# Patient Record
Sex: MALE | Race: WHITE | NOT HISPANIC OR LATINO | Employment: FULL TIME | URBAN - NONMETROPOLITAN AREA
[De-identification: names, ages, dates, MRNs, and addresses within clinical notes are randomized per-mention and may not be internally consistent; named-entity substitution may affect disease eponyms.]

---

## 2022-07-22 ENCOUNTER — TELEMEDICINE (OUTPATIENT)
Dept: MEDICAL GROUP | Facility: PHYSICIAN GROUP | Age: 52
End: 2022-07-22
Payer: COMMERCIAL

## 2022-07-22 VITALS — BODY MASS INDEX: 20.32 KG/M2 | HEIGHT: 72 IN | WEIGHT: 150 LBS

## 2022-07-22 DIAGNOSIS — Z00.00 HEALTHCARE MAINTENANCE: ICD-10-CM

## 2022-07-22 DIAGNOSIS — R33.9 URINARY RETENTION: ICD-10-CM

## 2022-07-22 PROCEDURE — 99204 OFFICE O/P NEW MOD 45 MIN: CPT | Performed by: STUDENT IN AN ORGANIZED HEALTH CARE EDUCATION/TRAINING PROGRAM

## 2022-07-22 RX ORDER — TAMSULOSIN HYDROCHLORIDE 0.4 MG/1
0.4 CAPSULE ORAL DAILY
Qty: 30 CAPSULE | Refills: 1 | Status: SHIPPED | OUTPATIENT
Start: 2022-07-22 | End: 2022-09-19 | Stop reason: SDUPTHER

## 2022-07-22 RX ORDER — SILDENAFIL CITRATE 20 MG/1
TABLET ORAL
COMMUNITY
Start: 2022-04-30 | End: 2022-11-08

## 2022-07-22 ASSESSMENT — PATIENT HEALTH QUESTIONNAIRE - PHQ9: CLINICAL INTERPRETATION OF PHQ2 SCORE: 0

## 2022-07-22 NOTE — PROGRESS NOTES
Virtual Visit: New Patient   This visit was conducted via Zoom using secure and encrypted videoconferencing technology.   The patient was in their home in the [facility].    The patient's identity was confirmed and verbal consent was obtained for this virtual visit.    Subjective:     CC:   Chief Complaint   Patient presents with   • Establish Care     48 hours has had trouble urinating, was able to go today      Siva Gorman is a 51 y.o. male presenting to establish [facility] and to discuss the evaluation and management of:    Problem   Urinary Retention    Occurred within the last 48 hours. Reports he tries to urinate but nothing comes out. Denies straining, reports he bears down and nothing comes out. Has been taking sildenafil because he was told this helps, reports when he takes it he is able to urinate a little. Reports father in his 80s for BPH.     Prior to 48hours has had weak stream, waking up 1-2 a night to urinate. Also sense of urgency and pressure.     No hematuria, no dysuria, no fevers. He takes no other medications           ROS  See HPI    Current medicines (including changes today)  Current Outpatient Medications   Medication Sig Dispense Refill   • sildenafil (REVATIO) 20 MG tablet      • tamsulosin (FLOMAX) 0.4 MG capsule Take 1 Capsule by mouth every day. 30 Capsule 1     No current facility-administered medications for this visit.       Patient Active Problem List    Diagnosis Date Noted   • Urinary retention 07/22/2022        Objective:   Ht 1.829 m (6')   Wt 68 kg (150 lb)   BMI 20.34 kg/m²     Physical Exam:  Constitutional: Alert, no distress, well-groomed.  Skin: No rashes in visible areas.  Eye: Round. Conjunctiva clear, lids normal. No icterus.   ENMT: Lips pink without lesions, good dentition, moist mucous membranes. Phonation normal.  Neck: No masses, no thyromegaly. Moves freely without pain.  Respiratory: Unlabored respiratory effort, no cough or audible wheeze  Psych:  Alert and oriented x3, normal affect and mood.     Assessment and Plan:   The following treatment plan was discussed:     Problem List Items Addressed This Visit     Urinary retention     Patient reporting acute onset urinary retention.  Reports pressure in his bladder and has not urinated much in the last 2 days.  Denies any dysuria, fevers, hematuria.  Does report to weak stream chronically.    Plan:  Rx Flomax 0.4 mg daily, labs ordered including CMP, PSA, TSH, urinalysis.  Bladder ultrasound ordered    Patient to follow-up next week for symptoms, exam and if time establish care.    1 new problem with moderate complexity medication management.    ER precautions given           Relevant Medications    tamsulosin (FLOMAX) 0.4 MG capsule    Other Relevant Orders    PSA TOTAL W/FREE PSA REFLEX    URINALYSIS,CULTURE IF INDICATED    TSH WITH REFLEX TO FT4    US-BLADDER      Other Visit Diagnoses     Healthcare maintenance        Relevant Orders    CBC WITHOUT DIFFERENTIAL    Basic Metabolic Panel    Lipid Profile          Follow-up: Return in about 1 week (around 7/29/2022) for symptoms.

## 2022-07-22 NOTE — ASSESSMENT & PLAN NOTE
Patient reporting acute onset urinary retention.  Reports pressure in his bladder and has not urinated much in the last 2 days.  Denies any dysuria, fevers, hematuria.  Does report to weak stream chronically.    Plan:  Rx Flomax 0.4 mg daily, labs ordered including CMP, PSA, TSH, urinalysis.  Bladder ultrasound ordered    Patient to follow-up next week for symptoms, exam and if time establish care.    1 new problem with moderate complexity medication management.    ER precautions given

## 2022-08-08 ENCOUNTER — OFFICE VISIT (OUTPATIENT)
Dept: MEDICAL GROUP | Facility: PHYSICIAN GROUP | Age: 52
End: 2022-08-08
Payer: COMMERCIAL

## 2022-08-08 VITALS
RESPIRATION RATE: 16 BRPM | WEIGHT: 134.92 LBS | HEIGHT: 72 IN | OXYGEN SATURATION: 95 % | BODY MASS INDEX: 18.27 KG/M2 | TEMPERATURE: 98.4 F | DIASTOLIC BLOOD PRESSURE: 70 MMHG | SYSTOLIC BLOOD PRESSURE: 112 MMHG | HEART RATE: 73 BPM

## 2022-08-08 DIAGNOSIS — R33.9 URINARY RETENTION: ICD-10-CM

## 2022-08-08 DIAGNOSIS — Z00.00 HEALTHCARE MAINTENANCE: ICD-10-CM

## 2022-08-08 PROCEDURE — 99396 PREV VISIT EST AGE 40-64: CPT | Performed by: STUDENT IN AN ORGANIZED HEALTH CARE EDUCATION/TRAINING PROGRAM

## 2022-08-08 NOTE — PROGRESS NOTES
Subjective:   HISTORY OF THE PRESENT ILLNESS: Patient is a 51 y.o. male here today to establish care    Problem   Healthcare Maintenance   Urinary Retention    Patient was seen at the last visit for an acute problem of urinary retention.  Since then he was put on Flomax with much improvement in his symptoms.  He is having less frequency, nocturia once a night, not straining to urinate.    Denies any dysuria, hematuria.  Still did not get the labs done but ultrasound shows postvoid residual within normal limits.          Current Outpatient Medications Ordered in Epic   Medication Sig Dispense Refill   • sildenafil (REVATIO) 20 MG tablet      • tamsulosin (FLOMAX) 0.4 MG capsule Take 1 Capsule by mouth every day. 30 Capsule 1     No current Epic-ordered facility-administered medications on file.       Review of systems:  Denies chest pain, dyspnea on exertion, dizziness, headaches, abdominal pain, nausea, vomiting, fevers, back pain.    No past medical history on file.  Past Surgical History:   Procedure Laterality Date   • CHOLECYSTECTOMY     • CYST EXCISION      choledocal cyst   • HERNIA REPAIR       Social History     Tobacco Use   • Smoking status: Never Smoker   • Smokeless tobacco: Never Used   Vaping Use   • Vaping Use: Never used   Substance Use Topics   • Alcohol use: Not Currently   • Drug use: Not Currently      No family history on file.  Current Outpatient Medications   Medication Sig Dispense Refill   • sildenafil (REVATIO) 20 MG tablet      • tamsulosin (FLOMAX) 0.4 MG capsule Take 1 Capsule by mouth every day. 30 Capsule 1     No current facility-administered medications for this visit.       Allergies:  No Known Allergies    Allergies, past medical history, past surgical history, family history, social history reviewed and updated.    Objective:    /70 (BP Location: Right arm, Patient Position: Sitting, BP Cuff Size: Adult)   Pulse 73   Temp 36.9 °C (98.4 °F) (Temporal)   Resp 16   Ht 1.829  m (6')   Wt 61.2 kg (134 lb 14.7 oz)   SpO2 95%   BMI 18.30 kg/m²    Body mass index is 18.3 kg/m².    Physical exam:  General: Normal appearance, no acute distress, not ill-appearing  HEENT: EOM intact, conjunctiva normal limits, negative right/left eye discharge.  Sclera anicteric  Cardiovascular: Normal rate and rhythm, no murmurs  Pulmonary: No respiratory distress, no wheezing, no rales, breath sounds normal.  Abdomen: Bowel sounds normal, flat, soft.  Musculoskeletal: No edema bilaterally  Skin: Warm, dry, no lesions  Neurological: No focal deficits, normal gait  Psychiatric: Mood within normal limits    Assessment/Plan:      Problem List Items Addressed This Visit     Urinary retention     Prostate exam today difficult to assess secondary to patient anxiousness.    Follow-up PSA.  Continue Flomax 0.4 mg daily.  Consider repeating prostate exam next year.           Healthcare maintenance     Patient here for a preventive medicine visit today and to establish care.  -Reviewed all past medical history, family history, social history    -Diet and exercise appropriate counseling given  -Social determinants of health reviewed  -Tobacco, alcohol, recreational drug use: reviewed no concerns.   -Cholesterol screening: ordered  -Diabetes screening: ordered      Immunizations/Infectious disease:    Safe sex practices discusssed  Immunizations: reports up to date.   Had covid vaccine with 2 booster.    Cancer screenings:  Colorectal cancer screening: colonoscopy 2018 [facility].  Records requested.    Prostate Cancer Screening/PSA: ordered                    Return in about 1 year (around 8/8/2023) for annual.

## 2022-08-08 NOTE — LETTER
[employer]  Calderon Marin D.O.  [address]  [facility] [address]  Fax: [phone]   Authorization for Release/Disclosure of   Protected Health Information   Name: SHERIF PANIAGUA : 1970 SSN: [id number]   Address: [address] [place] [id number] Phone:    [phone] (home)    I authorize the entity listed below to release/disclose the PHI below to:   [facility]/Calderon Marin D.O. and Calderon Marin D.O.   Provider or Entity Name:  [place], [place]   Address   City, [facility]   Phone:      Fax:     Reason for request: continuity of care   Information to be released:    [ x ] LAST COLONOSCOPY,  including any PATH REPORT and follow-up  [  ] LAST FIT/COLOGUARD RESULT [  ] LAST DEXA  [  ] LAST MAMMOGRAM  [  ] LAST PAP  [  ] LAST LABS [  ] RETINA EXAM REPORT  [  ] IMMUNIZATION RECORDS  [  ] Release all info      [  ] Check here and initial the line next to each item to release ALL health information INCLUDING  _____ Care and treatment for drug and / or alcohol abuse  _____ HIV testing, infection status, or AIDS  _____ Genetic Testing    DATES OF SERVICE OR TIME PERIOD TO BE DISCLOSED: _____________  I understand and acknowledge that:  * This Authorization may be revoked at any time by you in writing, except if your health information has already been used or disclosed.  * Your health information that will be used or disclosed as a result of you signing this authorization could be re-disclosed by the recipient. If this occurs, your re-disclosed health information may no longer be protected by State or Federal laws.  * You may refuse to sign this Authorization. Your refusal will not affect your ability to obtain treatment.  * This Authorization becomes effective upon signing and will  on (date) __________.      If no date is indicated, this Authorization will  one (1) year from the signature date.    Name: Sherif Paniagua    Signature:   Date:      8/8/2022       PLEASE FAX REQUESTED RECORDS BACK TO: [phone]

## 2022-08-08 NOTE — ASSESSMENT & PLAN NOTE
Patient here for a preventive medicine visit today and to establish care.  -Reviewed all past medical history, family history, social history    -Diet and exercise appropriate counseling given  -Social determinants of health reviewed  -Tobacco, alcohol, recreational drug use: reviewed no concerns.   -Cholesterol screening: ordered  -Diabetes screening: ordered      Immunizations/Infectious disease:    Safe sex practices discusssed  Immunizations: reports up to date.   Had covid vaccine with 2 booster.    Cancer screenings:  Colorectal cancer screening: colonoscopy 2018 [facility].  Records requested.    Prostate Cancer Screening/PSA: ordered

## 2022-08-08 NOTE — ASSESSMENT & PLAN NOTE
Prostate exam today difficult to assess secondary to patient anxiousness.    Follow-up PSA.  Continue Flomax 0.4 mg daily.  Consider repeating prostate exam next year.

## 2022-09-19 DIAGNOSIS — R33.9 URINARY RETENTION: ICD-10-CM

## 2022-09-19 NOTE — TELEPHONE ENCOUNTER
Received request via: Patient    Was the patient seen in the last year in this department? Yes    Does the patient have an active prescription (recently filled or refills available) for medication(s) requested? No    Patient called office in regards to needing a refill on his medication. He would also like to know if his labs came back normal.    Please advise.

## 2022-09-20 RX ORDER — TAMSULOSIN HYDROCHLORIDE 0.4 MG/1
0.4 CAPSULE ORAL DAILY
Qty: 30 CAPSULE | Refills: 1 | Status: SHIPPED | OUTPATIENT
Start: 2022-09-20 | End: 2022-11-18

## 2022-10-28 ENCOUNTER — OFFICE VISIT (OUTPATIENT)
Dept: URGENT CARE | Facility: CLINIC | Age: 52
End: 2022-10-28
Payer: COMMERCIAL

## 2022-10-28 VITALS
DIASTOLIC BLOOD PRESSURE: 60 MMHG | RESPIRATION RATE: 16 BRPM | OXYGEN SATURATION: 95 % | HEIGHT: 72 IN | SYSTOLIC BLOOD PRESSURE: 100 MMHG | TEMPERATURE: 97.3 F | BODY MASS INDEX: 18.28 KG/M2 | WEIGHT: 135 LBS | HEART RATE: 68 BPM

## 2022-10-28 DIAGNOSIS — R55 SYNCOPE AND COLLAPSE: ICD-10-CM

## 2022-10-28 DIAGNOSIS — S01.111A EYEBROW LACERATION, RIGHT, INITIAL ENCOUNTER: ICD-10-CM

## 2022-10-28 DIAGNOSIS — S00.83XA TRAUMATIC ECCHYMOSIS OF FACE, INITIAL ENCOUNTER: ICD-10-CM

## 2022-10-28 PROCEDURE — 99214 OFFICE O/P EST MOD 30 MIN: CPT | Performed by: NURSE PRACTITIONER

## 2022-10-29 NOTE — PROGRESS NOTES
Subjective:   Sherif Paniagua is a 51 y.o. male who presents for Fall (DOI 10/25/22, passed out, bruised eyes, abd pain, tightness on abdomen, light headed, dizziness, hit ceramic tile with head, closed head lac R eyebrow)       HPI  Patient presents for evaluation of syncopal event 3 days ago.  Patient states that he was feeling unwell, and was walking from the bathroom back to the bedroom, when he suffered a syncopal event, causing him to land face first onto a hard floor, causing a facial laceration.  Patient states he was able to obtain homeostasis and the bleeding ceased, has noted ecchymosis on nose, face, and around eyes eyes.  [facility] followed up with [facility] yesterday, think that it may have been a UTI causing him to have a syncopal event.  His urinalysis was clear at that time.  [facility] recommended he go to the ED for further evaluation and care, however he declined at that time.  Patient presents today with concerns of reason for syncopal event.    ROS  All other systems are negative except as documented above within HPI.    MEDS:   Current Outpatient Medications:     tamsulosin (FLOMAX) 0.4 MG capsule, Take 1 Capsule by mouth every day., Disp: 30 Capsule, Rfl: 1    sildenafil (REVATIO) 20 MG tablet, , Disp: , Rfl:   ALLERGIES: No Known Allergies    Patient's PMH, SocHx, SurgHx, FamHx, Drug allergies and medications were reviewed.     Objective:   /60 (BP Location: Left arm, Patient Position: Sitting, BP Cuff Size: Adult)   Pulse 68   Temp 36.3 °C (97.3 °F) (Temporal)   Resp 16   Ht 1.829 m (6')   Wt 61.2 kg (135 lb)   SpO2 95%   BMI 18.31 kg/m²     Physical Exam  Vitals and nursing note reviewed.   Constitutional:       General: He is awake.      Appearance: Normal appearance. He is well-developed and normal weight.   HENT:      Head: Normocephalic and atraumatic.        Comments: 2cm right eyebrow laceration with ecchymosis     Right Ear: Tympanic  membrane, ear canal and external ear normal.      Left Ear: Tympanic membrane, ear canal and external ear normal.      Nose: Nose normal.      Comments: Significant ecchymosis noted around patient's bilateral eyes, as well as along nose.  Right forehead laceration as diagrammed.     Mouth/Throat:      Lips: Pink.      Mouth: Mucous membranes are moist.      Pharynx: Oropharynx is clear. Uvula midline.   Eyes:      Extraocular Movements: Extraocular movements intact.      Conjunctiva/sclera: Conjunctivae normal.      Pupils: Pupils are equal, round, and reactive to light.   Neck:      Thyroid: No thyromegaly.      Trachea: Trachea normal.   Cardiovascular:      Rate and Rhythm: Normal rate and regular rhythm.      Pulses: Normal pulses.      Heart sounds: Normal heart sounds, S1 normal and S2 normal.   Pulmonary:      Effort: Pulmonary effort is normal. No respiratory distress.      Breath sounds: Normal breath sounds. No wheezing, rhonchi or rales.   Abdominal:      General: Bowel sounds are normal.      Palpations: Abdomen is soft.   Musculoskeletal:         General: Normal range of motion.      Cervical back: Full passive range of motion without pain, normal range of motion and neck supple.   Lymphadenopathy:      Cervical: No cervical adenopathy.   Skin:     General: Skin is warm and dry.      Capillary Refill: Capillary refill takes less than 2 seconds.   Neurological:      General: No focal deficit present.      Mental Status: He is alert and oriented to person, place, and time.      Gait: Gait is intact.   Psychiatric:         Attention and Perception: Attention and perception normal.         Mood and Affect: Mood normal.         Speech: Speech normal.         Behavior: Behavior normal. Behavior is cooperative.         Thought Content: Thought content normal.         Judgment: Judgment normal.       Assessment/Plan:   Assessment    1. Syncope and collapse    2. Eyebrow laceration, right, initial  encounter    3. Traumatic ecchymosis of face, initial encounter      Vital signs stable at today's acute urgent care visit.  Discussed with patient at length my concern as a cause of syncope.  It is apparent that he suffered a severe fall, consequently injury large eyebrow laceration and facial ecchymosis.  Patient has several questions as to outpatient work-up versus inpatient, and patient is recommended due to unknown cause of syncopal event.  All questions were encouraged and answered to the patient's satisfaction and understanding, and they agree to the plan of care.     This is an acute problem with uncertain prognosis, medication management and instructions as well as management options were provided.  I personally reviewed prior external notes and test results pertinent to today and independently reviewed and interpreted all diagnostics. Time spent evaluating this patient includes preparing for visit, counseling/education, exam, evaluation, obtaining history, and ordering lab/test/procedures.      Please note that this dictation was created using voice recognition software. I have made a reasonable attempt to correct obvious errors, but I expect that there are errors of grammar and possibly content that I did not discover before finalizing the note.

## 2022-11-01 ENCOUNTER — TELEMEDICINE (OUTPATIENT)
Dept: MEDICAL GROUP | Facility: PHYSICIAN GROUP | Age: 52
End: 2022-11-01
Payer: COMMERCIAL

## 2022-11-01 VITALS
DIASTOLIC BLOOD PRESSURE: 70 MMHG | HEIGHT: 72 IN | SYSTOLIC BLOOD PRESSURE: 118 MMHG | WEIGHT: 137 LBS | TEMPERATURE: 98.5 F | HEART RATE: 80 BPM | BODY MASS INDEX: 18.56 KG/M2

## 2022-11-01 DIAGNOSIS — S06.0X9A CONCUSSION WITH LOSS OF CONSCIOUSNESS, INITIAL ENCOUNTER: ICD-10-CM

## 2022-11-01 DIAGNOSIS — R55 SYNCOPE AND COLLAPSE: ICD-10-CM

## 2022-11-01 DIAGNOSIS — S09.90XA TRAUMATIC INJURY OF HEAD, INITIAL ENCOUNTER: ICD-10-CM

## 2022-11-01 PROCEDURE — 99214 OFFICE O/P EST MOD 30 MIN: CPT | Mod: 95 | Performed by: STUDENT IN AN ORGANIZED HEALTH CARE EDUCATION/TRAINING PROGRAM

## 2022-11-01 NOTE — LETTER
November 1, 2022    To Whom It May Concern:         This is confirmation that Sherif Paniagua attended his scheduled appointment with Calderon Marin D.O. on 11/01/22.    Sherif is currently under my care. It is medically indicated that he be off work for 1 week for further evaluation.          If you have any questions please do not hesitate to call me at the phone number listed below.    Sincerely,    Calderon Marin D.O.  [phone]

## 2022-11-01 NOTE — ASSESSMENT & PLAN NOTE
CT head without contrast ordered.  This was a one-time episode perhaps vasovagal syncope.  There are no other associated symptoms that he had been having before the injury, no other episodes of syncope or dizziness. No cardiac risk factors,  Only medication patient takes his Flomax.    Referral to cardiology

## 2022-11-01 NOTE — PROGRESS NOTES
Virtual Visit: Established Patient   This visit was conducted via Zoom using secure and encrypted videoconferencing technology.   The patient was in their home in the [facility].    The patient's identity was confirmed and verbal consent was obtained for this virtual visit.    Subjective:   CC:   Chief Complaint   Patient presents with    Syncope     Concerns of poss head injury/ headache, light headedness     Sherif Paniagua is a 51 y.o. male presenting for evaluation and management of:    Problem   Head Trauma    Patient had head trauma date of injury 10/25/2022.  Patient reports that he was in the bathroom then started to feel like the room was spinning as he walked over to go to his bedroom to lay down he fainted and hit his hard floors.  He did hit his head but did not want to go to the emergency room.  He went to urgent care had laceration, was not sent for CT imaging of his head.  He has been having some bruising of his left thigh that is starting to improve.  He reports he is having headaches, dizziness, lightheadedness, generalized weakness and fatigue.  Denies any nausea, vomiting, chest pain.     Syncope and Collapse    Patient fell causing the injury on 10/25/2022.  Noted the sensation of the room was spinning and then fainted and hit the head on his floor.  Loss of consciousness cannot say for sure how long he was out for.  This was a one-time occurrence and has never happened before and has never happened since.  He was not recently ill, has no other significant medical issues.  Denies any chest pain, palpitations, dyspnea on exertion, chronic dizziness or headaches.           ROS   Per HPI    Current medicines (including changes today)  Current Outpatient Medications   Medication Sig Dispense Refill    tamsulosin (FLOMAX) 0.4 MG capsule Take 1 Capsule by mouth every day. 30 Capsule 1    sildenafil (REVATIO) 20 MG tablet        No current facility-administered medications for this visit.        Patient Active Problem List    Diagnosis Date Noted    Head trauma 11/01/2022    Syncope and collapse 11/01/2022    Healthcare maintenance 08/08/2022    Urinary retention 07/22/2022        Objective:   /70   Pulse 80   Temp 36.9 °C (98.5 °F) (Temporal)   Ht 1.829 m (6')   Wt 62.1 kg (137 lb)   BMI 18.58 kg/m²     Physical Exam:  Constitutional: Alert, no distress, well-groomed.  Head: Laceration right-sided brow, left-sided ecchymosis periorbital  Skin: No rashes in visible areas.  Eye: Round. Conjunctiva clear, lids normal. No icterus.   ENMT: Lips pink without lesions, good dentition, moist mucous membranes. Phonation normal.  Neck: No masses, no thyromegaly. Moves freely without pain.  Respiratory: Unlabored respiratory effort, no cough or audible wheeze  Psych: Alert and oriented x3, normal affect and mood.     Assessment and Plan:   The following treatment plan was discussed:     Problem List Items Addressed This Visit       Head trauma     No focal neurological deficits but significant concussion type symptoms.    Referral for urgent imaging CT head without contrast  Referral for PMR for concussion management  Note given to get off work for at least 1 week for reevaluation    Return to care 1 week         Relevant Orders    CT-HEAD W/O    Syncope and collapse     CT head without contrast ordered.  This was a one-time episode perhaps vasovagal syncope.  There are no other associated symptoms that he had been having before the injury, no other episodes of syncope or dizziness. No cardiac risk factors,  Only medication patient takes his Flomax.    Referral to cardiology         Relevant Orders    CT-HEAD W/O    REFERRAL TO CARDIOLOGY     Other Visit Diagnoses       Concussion with loss of consciousness, initial encounter        Relevant Orders    Referral to Physical Medicine Rehab             Follow-up: Return in about 1 week (around 11/8/2022) for symptoms.

## 2022-11-01 NOTE — ASSESSMENT & PLAN NOTE
No focal neurological deficits but significant concussion type symptoms.    Referral for urgent imaging CT head without contrast  Referral for PMR for concussion management  Note given to get off work for at least 1 week for reevaluation    Return to care 1 week

## 2022-11-05 ENCOUNTER — HOSPITAL ENCOUNTER (OUTPATIENT)
Dept: RADIOLOGY | Facility: MEDICAL CENTER | Age: 52
End: 2022-11-05
Attending: STUDENT IN AN ORGANIZED HEALTH CARE EDUCATION/TRAINING PROGRAM
Payer: COMMERCIAL

## 2022-11-05 DIAGNOSIS — S09.90XA TRAUMATIC INJURY OF HEAD, INITIAL ENCOUNTER: ICD-10-CM

## 2022-11-05 DIAGNOSIS — R55 SYNCOPE AND COLLAPSE: ICD-10-CM

## 2022-11-05 PROCEDURE — 70450 CT HEAD/BRAIN W/O DYE: CPT

## 2022-11-08 ENCOUNTER — TELEMEDICINE (OUTPATIENT)
Dept: MEDICAL GROUP | Facility: PHYSICIAN GROUP | Age: 52
End: 2022-11-08
Payer: COMMERCIAL

## 2022-11-08 VITALS — BODY MASS INDEX: 18.56 KG/M2 | HEIGHT: 72 IN | WEIGHT: 137 LBS

## 2022-11-08 DIAGNOSIS — S06.0X9D CONCUSSION WITH LOSS OF CONSCIOUSNESS, SUBSEQUENT ENCOUNTER: ICD-10-CM

## 2022-11-08 PROBLEM — S06.0XAA CONCUSSION: Status: ACTIVE | Noted: 2022-11-08

## 2022-11-08 PROCEDURE — 99213 OFFICE O/P EST LOW 20 MIN: CPT | Mod: 95 | Performed by: STUDENT IN AN ORGANIZED HEALTH CARE EDUCATION/TRAINING PROGRAM

## 2022-11-08 NOTE — ASSESSMENT & PLAN NOTE
CT reviewed within normal limits  Physiatry referral in place for concussion management    Due to patient's continuing significant headaches, lightheadedness, fatigue I do recommend him being off work for 1 more week and then a slow gradual return to work.  Ideally patient should be symptom-free before returning to work.  Further recommendations from physiatry requested, patient has consultation this week.

## 2022-11-08 NOTE — PROGRESS NOTES
Virtual Visit: Established Patient   This visit was conducted via Zoom using secure and encrypted videoconferencing technology.   The patient was in their home in the [facility].    The patient's identity was confirmed and verbal consent was obtained for this virtual visit.    Subjective:   CC:   Chief Complaint   Patient presents with    Follow-Up     1 wk     Sherif Paniagua is a 52 y.o. male presenting for evaluation and management of:    Problem   Concussion    Patient is here to follow-up on his head injury from 10/25/2022.  CT scan of his head that was ordered on that date is negative for any acute findings.  He is still having significant headaches, fatigue and some lightheadedness.  Has not had any further episodes of syncope.  Denies any nausea, vomiting, blurry vision.  He still has not seen the physiatrist yet for concussion management he has been off work for the last week          ROS   Per hpi    Current medicines (including changes today)  Current Outpatient Medications   Medication Sig Dispense Refill    tamsulosin (FLOMAX) 0.4 MG capsule Take 1 Capsule by mouth every day. 30 Capsule 1     No current facility-administered medications for this visit.       Patient Active Problem List    Diagnosis Date Noted    Concussion 11/08/2022    Head trauma 11/01/2022    Syncope and collapse 11/01/2022    Healthcare maintenance 08/08/2022    Urinary retention 07/22/2022        Objective:   Ht 1.829 m (6')   Wt 62.1 kg (137 lb)   BMI 18.58 kg/m²     Physical Exam:  Constitutional: Alert, no distress, well-groomed.  Skin: No rashes in visible areas.  Eye: Round. Conjunctiva clear, lids normal. No icterus.   ENMT: Lips pink without lesions, good dentition, moist mucous membranes. Phonation normal.  Neck: No masses, no thyromegaly. Moves freely without pain.  Respiratory: Unlabored respiratory effort, no cough or audible wheeze  Psych: Alert and oriented x3, normal affect and mood.     Assessment and  Plan:   The following treatment plan was discussed:     Problem List Items Addressed This Visit       Concussion     CT reviewed within normal limits  Physiatry referral in place for concussion management    Due to patient's continuing significant headaches, lightheadedness, fatigue I do recommend him being off work for 1 more week and then a slow gradual return to work.  Ideally patient should be symptom-free before returning to work.  Further recommendations from physiatry requested, patient has consultation this week.              Follow-up: Return in about 2 weeks (around 11/22/2022) for symptoms.

## 2022-11-18 DIAGNOSIS — R33.9 URINARY RETENTION: ICD-10-CM

## 2022-11-18 RX ORDER — TAMSULOSIN HYDROCHLORIDE 0.4 MG/1
CAPSULE ORAL
Qty: 90 CAPSULE | Refills: 1 | Status: SHIPPED
Start: 2022-11-18 | End: 2023-04-26

## 2022-11-18 NOTE — TELEPHONE ENCOUNTER
Received request via: Patient    Was the patient seen in the last year in this department? Yes    Does the patient have an active prescription (recently filled or refills available) for medication(s) requested? No    Does the patient have long-term Plus and need 100 day supply (blood pressure, diabetes and cholesterol meds only)? Patient does not have SCP

## 2022-11-23 ENCOUNTER — OFFICE VISIT (OUTPATIENT)
Dept: PHYSICAL MEDICINE AND REHAB | Facility: MEDICAL CENTER | Age: 52
End: 2022-11-23
Payer: COMMERCIAL

## 2022-11-23 VITALS
HEART RATE: 70 BPM | OXYGEN SATURATION: 97 % | BODY MASS INDEX: 18.83 KG/M2 | DIASTOLIC BLOOD PRESSURE: 72 MMHG | SYSTOLIC BLOOD PRESSURE: 130 MMHG | HEIGHT: 72 IN | TEMPERATURE: 96.7 F | RESPIRATION RATE: 17 BRPM | WEIGHT: 139 LBS

## 2022-11-23 DIAGNOSIS — R45.89: ICD-10-CM

## 2022-11-23 DIAGNOSIS — F07.81 POSTCONCUSSIVE SYNDROME: Primary | ICD-10-CM

## 2022-11-23 DIAGNOSIS — R42 VERTIGO: ICD-10-CM

## 2022-11-23 DIAGNOSIS — G44.319 ACUTE POST-TRAUMATIC HEADACHE, NOT INTRACTABLE: ICD-10-CM

## 2022-11-23 DIAGNOSIS — R68.89 LIGHT SENSITIVITY: ICD-10-CM

## 2022-11-23 DIAGNOSIS — R42 DIZZINESS: ICD-10-CM

## 2022-11-23 PROCEDURE — 99204 OFFICE O/P NEW MOD 45 MIN: CPT | Performed by: PHYSICAL MEDICINE & REHABILITATION

## 2022-11-23 NOTE — PROGRESS NOTES
[facility]  [facility]  [facility] [place] [address]  Ph: [phone]    NEW PATIENT POST-CONCUSSIVE EVALUATION    Patient Name: Sherif Paniagua   Patient : 1970  Patient Age: 52 y.o.   Examining Physician: Dr. Keiko Samuel DO    SUBJECTIVE:   Patient Identification: Sherif Paniagua is a 52 y.o. male without significant PMH and rehabilitation history significant for evaluation for postconcussive syndrome and is presenting to PM&R clinic for a NEW OUTPATIENT evaluation with the following chief complaint/s:    Chief Complaint: Postconcussive evaluation    History of Present Illness:    -Records reviewed: Patient sustained his injury 10/25/2022.  Reportedly he was in the bathroom and felt like the room was spinning.  He walked over to his bedroom to try to lay down but fainted and hit the hard floor.  He went to urgent care.  He had a laceration on his head.  He was not sent for imaging.  He also had some bruising of his left thigh which improved.  Patient has been referred to cardiology for the syncope.  -Approximate date/time of injury: 10/25/2022  -Loss of consciousness: Yes, unclear duration.  -Duration of memory loss: None  -Imaging findings: CT head negative  -GCS at time of injury: Unknown -no first responders to assess.  -Mood disorder prior to injury: None  -Mood abnormalities after injury: Yes - more emotional  -Occupation: Currently not working.     Having post traumatic headaches, vertigo, forgetfulness. The headaches are worse in the afternoon, worse with using computer. No difficulty sleeping. Sleeping a little more than he had. Has had some problem urinating the past few months and is on Flomax. Has had cholecystectomy and cyst removed from duodenum. Has been more emotional lately. Example is watching something sad on TV and crying or being more irritable than prior with his daughter. Has some light sensitivity as well. Keeps low light, or  using a hoodie to block the direct light. He is not taking any medications for his HA. Lays down and rests. The vertigo is what is most concerning to him. It is not all day. It is everyday for short periods. Feels light headed and imbalanced. Feels as though the world is spinning to a smaller degree. For the most part he is managing conservatively. Has a small degree of difficulty concentrating. He does feel like his sinus pain is improving since swelling reduced.     Review of Systems:  All other pertinent positive review of systems are noted above in HPI.   All other systems reviewed and are negative.    Past Medical History:  No past medical history on file.   Past Surgical History:   Procedure Laterality Date    CHOLECYSTECTOMY      CYST EXCISION      choledocal cyst    HERNIA REPAIR          Current Outpatient Medications:     tamsulosin (FLOMAX) 0.4 MG capsule, TAKE ONE CAPSULE BY MOUTH DAILY, Disp: 90 Capsule, Rfl: 1  No Known Allergies     Past Social History:  Social History     Socioeconomic History    Marital status:      Spouse name: Not on file    Number of children: Not on file    Years of education: Not on file    Highest education level: Not on file   Occupational History    Not on file   Tobacco Use    Smoking status: Never    Smokeless tobacco: Never   Vaping Use    Vaping Use: Never used   Substance and Sexual Activity    Alcohol use: Not Currently    Drug use: Not Currently    Sexual activity: Not on file   Other Topics Concern    Not on file   Social History Narrative    Not on file     Social Determinants of Health     Financial Resource Strain: Not on file   Food Insecurity: Not on file   Transportation Needs: Not on file   Physical Activity: Not on file   Stress: Not on file   Social Connections: Not on file   Intimate Partner Violence: Not on file   Housing Stability: Not on file        Family History:  History reviewed. No pertinent family history.    Depression and Opioid  Screening  PHQ-9:      7/22/2022     9:40 AM   Depression Screen (PHQ-2/PHQ-9)   PHQ-2 Total Score 0     Interpretation of PHQ-9 Total Score   Score Severity   1-4 No Depression   5-9 Mild Depression   10-14 Moderate Depression   15-19 Moderately Severe Depression   20-27 Severe Depression     OBJECTIVE:   Vital Signs:  Vitals:    11/23/22 0926   BP: 130/72   Pulse: 70   Resp: 17   Temp: 35.9 °C (96.7 °F)   SpO2: 97%        Pertinent Labs:  Lab Results   Component Value Date/Time    SODIUM 142 09/10/2022 11:10 AM    POTASSIUM 4.0 09/10/2022 11:10 AM    CHLORIDE 104 09/10/2022 11:10 AM    CO2 32 09/10/2022 11:10 AM    GLUCOSE 74 09/10/2022 11:10 AM    BUN 13 09/10/2022 11:10 AM    CREATININE 0.79 (L) 09/10/2022 11:10 AM    GLOMRATE 103 09/10/2022 11:10 AM       No results found for: HBA1C    No results found for: WBC, RBC, HEMOGLOBIN, HEMATOCRIT, MCV, MCH, MCHC, MPV, NEUTSPOLYS, LYMPHOCYTES, MONOCYTES, EOSINOPHILS, BASOPHILS, HYPOCHROMIA, ANISOCYTOSIS    No results found for: ASTSGOT, ALTSGPT     Physical Exam:   GEN: No apparent distress  HEENT: Head normocephalic, healed laceration right eyebrow.  Sclera nonicteric bilaterally, no ocular discharge appreciated bilaterally.  CV: Extremities warm and well-perfused, no peripheral edema appreciated bilaterally.  PULMONARY: Breathing nonlabored on room air, no respiratory accessory muscle use.  Not requiring supplemental oxygen.  SKIN: No appreciable skin breakdown on exposed areas of skin.  PSYCH: Mood and affect within normal limits.  NEURO: Awake alert.  Conversational.  Logical thought content.  Strength intact, ambulatory without assistive device.    Imaging:   CT head 11/5/2022  IMPRESSION:  1.  No evidence of acute intracranial process.  2.  Atrophy.    ASSESSMENT/PLAN: Sherif Paniagua  is a 52 y.o. male with rehabilitation history significant for postconcussive syndrome, here for evaluation. The following plan was discussed with the patient who is in  agreement.     Visit Diagnoses     ICD-10-CM   1. Postconcussive syndrome  F07.81   2. Vertigo  R42   3. Dizziness  R42   4. Light sensitivity  R68.89   5. Acute post-traumatic headache, not intractable  G44.319   6. Increased emotional excitability  R45.89        Rehab/Neuro:   Postconcussion syndrome/persistent postconcussion symptoms  -Date of injury: 10/25/2022  -Records reviewed as aforementioned in the HPI.  -Counseled on post concussive syndrome, symptoms, prognosis, treatment options.  -Counseled that treatment is aimed at symptom alleviation, as opposed to modifying underlying pathology.  -Diagnostic Criteria DSM-IV:   A. History of TBI: No history of prior TBI.    B. Neurobehavioral testing of cognitive deficits: No   C. 3+ symptoms persisting 3+ months after injury: Patient is not 3 months post-injury  -Diagnostic criteria ICD-10:   A. Symptom initiation within 1 month of injury: Yes   A. 3+ of the following symptoms: Yes    a.  Headache: Yes    b.  Dizziness: Yes    c.  Fatigue: Yes    d.  Irritability: Yes    e.  Insomnia: No    f.  Difficulty concentrating: Yes    g.  Memory problems: Yes    h: Intolerance of stress, emotion, alcohol: Yes, mild    Assessment of Current Functional Status: Patient currently is in between jobs.  He is able to work around his symptoms.  He rests when he needs to rest.  Fortunately he is sleeping really well.  Counseled extensively on adequate sleep, naps okay during the day as long as he maintains good sleep patterns at night.  At this time I counseled regarding symptom management.  Patient wishes to avoid pharmacologic agents which I think is completely reasonable.  He fortunately is getting better every day and is well within the timeframe of expected continued neuro recovery.  We discussed vestibular therapy or speech therapy for memory, I do not think it is entirely indicated at this time but if patient continues to have issues he knows that he can reach out and I will  refer him.      Follow up: As needed if symptoms persist or do not improve    Thank you to the referring practitioner for the ability to assist with co-management of this patient. If there are any questions or concerns, please do not hesitate to contact me.     Total time spent was 45 minutes.  Included in this time is the time spent preparing for the visit including record review, my exam and evaluation, counseling and education regarding that which is aforementioned in the assessment and plan. Some of the time included occurred outside of charting time.  Discussion involved the patient.     Please note that this dictation was created using voice recognition software. I have made every reasonable attempt to correct obvious errors but there may be errors of grammar and content that I may have overlooked prior to finalization of this note.    Dr. Keiko Samuel DO, MS  Department of Physical Medicine & Rehabilitation  Neuro Rehabilitation Clinic  [facility]

## 2023-01-23 ENCOUNTER — TELEPHONE (OUTPATIENT)
Dept: CARDIOLOGY | Facility: MEDICAL CENTER | Age: 53
End: 2023-01-23
Payer: COMMERCIAL

## 2023-01-23 NOTE — TELEPHONE ENCOUNTER
Called and spoke to pt, he states this will be his first time seeing a cardiology. Most recent labs in [facility] from 9/2022. Confirm appt with [facility] time, date, and location.

## 2023-02-07 ENCOUNTER — OFFICE VISIT (OUTPATIENT)
Dept: MEDICAL GROUP | Facility: PHYSICIAN GROUP | Age: 53
End: 2023-02-07
Payer: COMMERCIAL

## 2023-02-07 VITALS
TEMPERATURE: 97.7 F | DIASTOLIC BLOOD PRESSURE: 70 MMHG | BODY MASS INDEX: 18.96 KG/M2 | HEART RATE: 70 BPM | HEIGHT: 72 IN | OXYGEN SATURATION: 98 % | WEIGHT: 139.99 LBS | SYSTOLIC BLOOD PRESSURE: 132 MMHG | RESPIRATION RATE: 12 BRPM

## 2023-02-07 DIAGNOSIS — I45.9 SKIPPED HEART BEATS: ICD-10-CM

## 2023-02-07 DIAGNOSIS — R55 SYNCOPE AND COLLAPSE: ICD-10-CM

## 2023-02-07 PROCEDURE — 99213 OFFICE O/P EST LOW 20 MIN: CPT | Performed by: STUDENT IN AN ORGANIZED HEALTH CARE EDUCATION/TRAINING PROGRAM

## 2023-02-07 PROCEDURE — 93000 ELECTROCARDIOGRAM COMPLETE: CPT | Performed by: STUDENT IN AN ORGANIZED HEALTH CARE EDUCATION/TRAINING PROGRAM

## 2023-02-07 ASSESSMENT — PATIENT HEALTH QUESTIONNAIRE - PHQ9: CLINICAL INTERPRETATION OF PHQ2 SCORE: 0

## 2023-02-07 NOTE — ASSESSMENT & PLAN NOTE
EKG reviewed today shows sinus rate and rhythm, no ST changes, no T wave inversions, normal axis, normal intervals.    Zio patch ordered 14 days  Patient ports he has cardiology consultation next month    Follow-up 2 months or earlier if needed

## 2023-02-07 NOTE — PROGRESS NOTES
HISTORY OF PRESENT ILLNESS: Sherif is a pleasant 52 y.o. male, established patient who presents today to discuss medical problems as listed below:    Problem   Skipped Heart Beats    Patient reports feels like heart is stopping or skipping beats at night. Was referred to cardiology for testing for 1 episode of syncope and collapse but had declined at that time. Would like to get further testing done now at this time due to these increased symptoms. Reports when laying flight feels like heart skips beats.     Reports he does snore occasionally, denies that wife reports it is loud.     Denies any chest pain, dyspnea on exertion, edema, orthopnea.              Current Outpatient Medications Ordered in Epic   Medication Sig Dispense Refill    tamsulosin (FLOMAX) 0.4 MG capsule TAKE ONE CAPSULE BY MOUTH DAILY 90 Capsule 1     No current Epic-ordered facility-administered medications on file.       Review of systems:  Per HPI    History reviewed. No pertinent past medical history.  Past Surgical History:   Procedure Laterality Date    CHOLECYSTECTOMY      CYST EXCISION      choledocal cyst    HERNIA REPAIR       Social History     Tobacco Use    Smoking status: Never    Smokeless tobacco: Never   Vaping Use    Vaping Use: Never used   Substance Use Topics    Alcohol use: Not Currently    Drug use: Not Currently      History reviewed. No pertinent family history.  Current Outpatient Medications   Medication Sig Dispense Refill    tamsulosin (FLOMAX) 0.4 MG capsule TAKE ONE CAPSULE BY MOUTH DAILY 90 Capsule 1     No current facility-administered medications for this visit.       Allergies:  No Known Allergies    Allergies, past medical history, past surgical history, family history, social history reviewed and updated.    Objective:    /70 (BP Location: Right arm, Patient Position: Sitting, BP Cuff Size: Adult)   Pulse 70   Temp 36.5 °C (97.7 °F) (Temporal)   Resp 12   Ht 1.829 m (6')   Wt 63.5 kg (139 lb  15.9 oz)   SpO2 98%   BMI 18.99 kg/m²    Body mass index is 18.99 kg/m².    Physical exam:  General: Normal appearance, no acute distress, not ill-appearing  HEENT: EOM intact, conjunctiva normal limits, negative right/left eye discharge.  Sclera anicteric  Cardiovascular: Normal rate and rhythm, no murmurs  Pulmonary: No respiratory distress, no wheezing, no rales, breath sounds normal.  Abdomen: Bowel sounds normal, flat, soft.  Musculoskeletal: No edema bilaterally  Skin: Warm, dry, no lesions  Neurological: No focal deficits, normal gait  Psychiatric: Mood within normal limits    Assessment/Plan:    Problem List Items Addressed This Visit       Syncope and collapse    Relevant Orders    EKG - Clinic Performed    [facility] ZIO PATCH MONITOR    Skipped heart beats     EKG reviewed today shows sinus rate and rhythm, no ST changes, no T wave inversions, normal axis, normal intervals.    Zio patch ordered 14 days  Patient ports he has cardiology consultation next month    Follow-up 2 months or earlier if needed         Relevant Orders    EKG - Clinic Performed    [facility] ZIO PATCH MONITOR        Return in about 2 months (around 4/7/2023), or if symptoms worsen or fail to improve, for symptoms.

## 2023-02-14 ENCOUNTER — NON-PROVIDER VISIT (OUTPATIENT)
Dept: CARDIOLOGY | Facility: MEDICAL CENTER | Age: 53
End: 2023-02-14
Attending: STUDENT IN AN ORGANIZED HEALTH CARE EDUCATION/TRAINING PROGRAM
Payer: COMMERCIAL

## 2023-02-14 DIAGNOSIS — I49.1 APC (ATRIAL PREMATURE CONTRACTIONS): ICD-10-CM

## 2023-02-14 DIAGNOSIS — I47.10 SVT (SUPRAVENTRICULAR TACHYCARDIA) (HCC): ICD-10-CM

## 2023-02-14 DIAGNOSIS — I45.9 SKIPPED HEART BEATS: ICD-10-CM

## 2023-02-14 DIAGNOSIS — R55 SYNCOPE AND COLLAPSE: ICD-10-CM

## 2023-02-14 DIAGNOSIS — I49.3 PVC'S (PREMATURE VENTRICULAR CONTRACTIONS): ICD-10-CM

## 2023-02-14 NOTE — PROGRESS NOTES
Home enrollment completed in the 14 day Zio XT Holter monitoring program, per Calderon Marin D.O  Monitor will be shipped to patient via [employer].  >Pending EOS.

## 2023-03-09 ENCOUNTER — TELEPHONE (OUTPATIENT)
Dept: CARDIOLOGY | Facility: MEDICAL CENTER | Age: 53
End: 2023-03-09
Payer: COMMERCIAL

## 2023-03-10 PROCEDURE — 93248 EXT ECG>7D<15D REV&INTERPJ: CPT | Performed by: INTERNAL MEDICINE

## 2023-04-26 ENCOUNTER — OFFICE VISIT (OUTPATIENT)
Dept: CARDIOLOGY | Facility: PHYSICIAN GROUP | Age: 53
End: 2023-04-26
Payer: COMMERCIAL

## 2023-04-26 VITALS
OXYGEN SATURATION: 98 % | WEIGHT: 138.89 LBS | SYSTOLIC BLOOD PRESSURE: 118 MMHG | RESPIRATION RATE: 12 BRPM | HEART RATE: 71 BPM | DIASTOLIC BLOOD PRESSURE: 78 MMHG | BODY MASS INDEX: 18.81 KG/M2 | HEIGHT: 72 IN

## 2023-04-26 DIAGNOSIS — I49.3 PVCS (PREMATURE VENTRICULAR CONTRACTIONS): ICD-10-CM

## 2023-04-26 DIAGNOSIS — R55 SYNCOPE AND COLLAPSE: ICD-10-CM

## 2023-04-26 DIAGNOSIS — I45.9 SKIPPED HEART BEATS: ICD-10-CM

## 2023-04-26 DIAGNOSIS — S06.0X9D CONCUSSION WITH LOSS OF CONSCIOUSNESS, SUBSEQUENT ENCOUNTER: ICD-10-CM

## 2023-04-26 PROCEDURE — 99204 OFFICE O/P NEW MOD 45 MIN: CPT | Performed by: NURSE PRACTITIONER

## 2023-04-26 ASSESSMENT — ENCOUNTER SYMPTOMS
SHORTNESS OF BREATH: 0
HEADACHES: 0
ABDOMINAL PAIN: 0
CHILLS: 0
MYALGIAS: 0
NAUSEA: 0
BRUISES/BLEEDS EASILY: 0
ORTHOPNEA: 0
COUGH: 0
DIZZINESS: 0
LOSS OF CONSCIOUSNESS: 1
INSOMNIA: 1
PALPITATIONS: 0
PND: 0
FEVER: 0

## 2023-04-26 NOTE — PROGRESS NOTES
Chief Complaint   Patient presents with    New Patient    Syncope    Palpitations    Premature Ventricular Contractions (PVCs)              Subjective     NEW PATIENT CONSULTATION:    Sherif Paniagua is a 52 y.o. male who presents today for evaluation of syncope at the request of Dr. Calderon Marin.    Sherif is a 52 year old male with no significant past medical or cardiac history, who in November 2022, had an episode of severe abdominal pain, followed by fall/syncopal event in his bathroom, and he ended up hitting his head. CT scan of the head was negative for any acute abnormality, but he was treated for concussion, including referral to physiatry for one visit. He is slowly making progress.  He did have one more similar type episode a few weeks later, but no head injury.    He did have a ZioPatch on March 2023, which showed several short episodes of SVT, and PVC burden at 2.1%.    He is here to be evaluated. He does feel some occasional skipped beats, usually at nighttime, and he tends to sleep in a recliner, on his left side, as this helps. No overt chest pain, pressure or discomfort; no sustained palpitations; no shortness of breath; no dizziness; no LE edema. No further episodes of syncope. BP is stable.    He does not smoke, rarely drinks EtOH and denies any other drug use, including marijuana. He drinks 1 cup of coffee in the AM, and 1/2 cup of tea later in the day. He does not sleep terribly well. 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    No significant family history of premature CAD.    Cardiovascular Risk Factors:  1. Smoking status: no  2. Type II Diabetes Mellitus: no  3. Hypertension: no  4. Dyslipidemia: no  5. Family history of early Coronary Artery Disease in a first degree relative (Male less than 55 years of age; Female less than 65 years of age): no  6.  Obesity and/or Metabolic Syndrome: BMI 18.84  7. Sedentary lifestyle:  no     Past Medical History:   Diagnosis Date    Concussion 11/2022    PVCs (premature ventricular contractions) 03/2023    ZioPatch with PVC burden 2.1%    Syncope 11/2022    Hit head and developed concussion     Past Surgical History:   Procedure Laterality Date    CHOLECYSTECTOMY  2015    APPENDECTOMY      As a child    CYST EXCISION      choledocal cyst    HERNIA REPAIR       Family History   Problem Relation Age of Onset    Psychiatric Illness Father      Social History     Socioeconomic History    Marital status:      Spouse name: Not on file    Number of children: Not on file    Years of education: Not on file    Highest education level: Not on file   Occupational History    Not on file   Tobacco Use    Smoking status: Never    Smokeless tobacco: Never   Vaping Use    Vaping Use: Never used   Substance and Sexual Activity    Alcohol use: Not Currently    Drug use: Not Currently    Sexual activity: Not on file   Other Topics Concern    Not on file   Social History Narrative    Not on file     Social Determinants of Health     Financial Resource Strain: Not on file   Food Insecurity: Not on file   Transportation Needs: Not on file   Physical Activity: Not on file   Stress: Not on file   Social Connections: Not on file   Intimate Partner Violence: Not on file   Housing Stability: Not on file     No Known Allergies  Outpatient Encounter Medications as of 4/26/2023   Medication Sig Dispense Refill    Ascorbic Acid (JAGDEEP-C PO) Take  by mouth.      VITAMIN D PO Take  by mouth.      Vitamins-Lipotropics ([employer] VITAMIN B/C PO) Take  by mouth.      [DISCONTINUED] tamsulosin (FLOMAX) 0.4 MG capsule TAKE ONE CAPSULE BY MOUTH DAILY (Patient not taking: Reported on 4/26/2023) 90 Capsule 1     No facility-administered encounter medications on file as of 4/26/2023.     Review of Systems   Constitutional:  Positive for malaise/fatigue. Negative for chills and fever.   HENT:  Negative for congestion.    Respiratory:   Negative for cough and shortness of breath.    Cardiovascular:  Negative for chest pain, palpitations, orthopnea, leg swelling and PND.   Gastrointestinal:  Negative for abdominal pain and nausea.   Musculoskeletal:  Negative for myalgias.   Skin:  Negative for rash.   Neurological:  Positive for loss of consciousness. Negative for dizziness and headaches.   Endo/Heme/Allergies:  Does not bruise/bleed easily.   Psychiatric/Behavioral:  The patient has insomnia.             Objective     /78 (BP Location: Left arm, Patient Position: Sitting, BP Cuff Size: Adult)   Pulse 71   Resp 12   Ht 1.829 m (6')   Wt 63 kg (138 lb 14.2 oz)   SpO2 98%   BMI 18.84 kg/m²     Physical Exam  Constitutional:       Appearance: He is well-developed.      Comments: BMI 18.84 Tall, thin   HENT:      Head: Normocephalic.   Neck:      Vascular: No JVD.   Cardiovascular:      Rate and Rhythm: Normal rate and regular rhythm.      Heart sounds: Normal heart sounds.   Pulmonary:      Effort: Pulmonary effort is normal. No respiratory distress.      Breath sounds: Normal breath sounds. No wheezing or rales.   Abdominal:      General: Bowel sounds are normal. There is no distension.      Palpations: Abdomen is soft.      Tenderness: There is no abdominal tenderness.   Musculoskeletal:         General: Normal range of motion.      Cervical back: Normal range of motion and neck supple.   Skin:     General: Skin is warm and dry.      Findings: No rash.   Neurological:      Mental Status: He is alert and oriented to person, place, and time.   Psychiatric:         Mood and Affect: Mood normal.         Behavior: Behavior normal.      EKG of 2/7/2023 (PCP's office) show sinus rhythm at 70bpm. No acute ST-T wave changes. QTc 444ms.    10-year ASCVD risk: 3.3%    Summary of ZioPatch of 3/10/2023:  Short runs of supraventricular tachycardia that do not meet criteria for diagnosis of atrial flutter or atrial fibrillation.   Rare premature atrial  complexes (<1%).   Occasional ventricular complexes (2.1%).   No malignant arrhythmias identified.   No sinus pause.   No significant AV block.      IMPRESSION OF CT SCAN OF HEAD OF 11/5/2022:  1.  No evidence of acute intracranial process.  2.  Atrophy.    LABS AS OF 9/10/2022:  Cholesterol, S/P <=200 mg/dL 180    Triglycerides <=150 mg/dL 109    LDL Cholesterol <=99 mg/dL 116 High     HDL Cholesterol 40 - 59 mg/dL 54    Cholesterol/HDL Ratio <=4.5 ratio 3.3      TSH 0.27 - 4.20 mU/L 1.91      Component      Latest Ref Rng 9/10/2022   Sodium      136 - 144 mmol/L 142 (E)   Potassium      3.6 - 5.1 mmol/L 4.0 (E)   Chloride      102 - 110 mmol/L 104 (E)   Co2      22 - 32 mmol/L 32 (E)   Bun      8 - 20 mg/dL 13 (E)   Creatinine      0.80 - 1.40 mg/dL 0.79 (L) (E)   Calcium      8.7 - 10.3 mg/dL 9.9 (E)   Anion Gap      2 - 11 mmol/L 6 (E)   Glom Filt Rate, Est      >60 mL/min/1.7 103 (E)   Glucose      60 - 99 mg/dL 74 (E)        Assessment & Plan     1. Syncope and collapse  EC-ECHOCARDIOGRAM COMPLETE W/O CONT      2. Skipped heart beats        3. PVCs (premature ventricular contractions)        4. Concussion with loss of consciousness, subsequent encounter            Medical Decision Making: Today's Assessment/Status/Plan:      1. Syncope in November 2022, following intense abdominal pain, possible vasovagal episode. He has not had any further episodes. We discussed staying well hydrated, possibly adding some salt to his diet and taking his time between changing positions. We will also check an echocardiogram, to assess heart function.    2. PVCs, burden on 2.1%. I do not want to add a beta blocker at this time; we can try OTC Magnesium oxide 400-800mg once daily to see if these help. We discussed at length what PVCs are.    3. Concussion, making good progress. Has seen physiatry.    As above, echocardiogram and follow-up with afterwards to discuss results, and assess response to magnesium. Follow-up sooner if  clinical condition changes.

## 2023-04-28 ENCOUNTER — OFFICE VISIT (OUTPATIENT)
Dept: MEDICAL GROUP | Facility: PHYSICIAN GROUP | Age: 53
End: 2023-04-28
Payer: COMMERCIAL

## 2023-04-28 VITALS
RESPIRATION RATE: 12 BRPM | WEIGHT: 138.89 LBS | HEIGHT: 72 IN | BODY MASS INDEX: 18.81 KG/M2 | TEMPERATURE: 97.3 F | HEART RATE: 84 BPM | OXYGEN SATURATION: 97 % | DIASTOLIC BLOOD PRESSURE: 60 MMHG | SYSTOLIC BLOOD PRESSURE: 104 MMHG

## 2023-04-28 DIAGNOSIS — I49.3 PVCS (PREMATURE VENTRICULAR CONTRACTIONS): Primary | ICD-10-CM

## 2023-04-28 DIAGNOSIS — R33.9 URINARY RETENTION: ICD-10-CM

## 2023-04-28 PROCEDURE — 99213 OFFICE O/P EST LOW 20 MIN: CPT | Performed by: STUDENT IN AN ORGANIZED HEALTH CARE EDUCATION/TRAINING PROGRAM

## 2023-04-28 RX ORDER — LANOLIN ALCOHOL/MO/W.PET/CERES
400 CREAM (GRAM) TOPICAL DAILY
Qty: 60 TABLET | Refills: 3 | Status: SHIPPED | OUTPATIENT
Start: 2023-04-28 | End: 2024-02-21

## 2023-04-28 NOTE — PROGRESS NOTES
HISTORY OF PRESENT ILLNESS: Sherif is a pleasant 52 y.o. male, established patient who presents today to discuss medical problems as listed below:    #Syncope   #PVCs  #Concussion, postconcussive symptoms  Patient here following up on his last visit.  Last year he had a head trauma and fall secondary to syncope and collapse.  This is happened twice now and has since resolved.  He had cardiac referral for monitoring and was found to have multiple PVCs and was advised to start magnesium instead of starting a medication.  He reports that he feels well, he does have some difficulty sleeping at night when he feels his PVCs as they give him anxiety.  He also has some increased trouble focusing on studying for a test due to his concussive symptoms but this is improving.    Current Outpatient Medications Ordered in Epic   Medication Sig Dispense Refill    magnesium oxide 400 (240 Mg) MG Tab Take 1 Tablet by mouth every day. 60 Tablet 3    VITAMIN D PO Take  by mouth.      Vitamins-Lipotropics ([facility] VITAMIN B/C PO) Take  by mouth.      Ascorbic Acid (JAGDEEP-C PO) Take  by mouth. (Patient not taking: Reported on 4/28/2023)       No current [facility]-ordered facility-administered medications on file.       Review of systems:  Denies chest pain, shortness of breath with exertion, edema, headaches, dizziness, abdominal pain    Past Medical History:   Diagnosis Date    Concussion 11/2022    PVCs (premature ventricular contractions) 03/2023    Taty with PVC burden 2.1%    Syncope 11/2022    Hit head and developed concussion    Thyroid disease      Past Surgical History:   Procedure Laterality Date    CHOLECYSTECTOMY  2015    APPENDECTOMY      As a child    CYST EXCISION      choledocal cyst    HERNIA REPAIR       Social History     Tobacco Use    Smoking status: Never    Smokeless tobacco: Never   Vaping Use    Vaping Use: Never used   Substance Use Topics    Alcohol use: Not Currently    Drug use: Never      Family History    Problem Relation Age of Onset    Psychiatric Illness Father      Current Outpatient Medications   Medication Sig Dispense Refill    magnesium oxide 400 (240 Mg) MG Tab Take 1 Tablet by mouth every day. 60 Tablet 3    VITAMIN D PO Take  by mouth.      Vitamins-Lipotropics ([employer] VITAMIN B/C PO) Take  by mouth.      Ascorbic Acid (JAGDEEP-C PO) Take  by mouth. (Patient not taking: Reported on 4/28/2023)       No current facility-administered medications for this visit.       Allergies:  No Known Allergies    Allergies, past medical history, past surgical history, family history, social history reviewed and updated.    Objective:    /60 (BP Location: Left arm, Patient Position: Sitting, BP Cuff Size: Adult)   Pulse 84   Temp 36.3 °C (97.3 °F) (Temporal)   Resp 12   Ht 1.829 m (6')   Wt 63 kg (138 lb 14.2 oz)   SpO2 97%   BMI 18.84 kg/m²    Body mass index is 18.84 kg/m².    Physical exam:  General: Normal appearance, no acute distress, not ill-appearing  HEENT: EOM intact, conjunctiva normal limits, negative right/left eye discharge.  Sclera anicteric  Cardiovascular: Normal rate and rhythm, no murmurs  Pulmonary: No respiratory distress, no wheezing, no rales, breath sounds normal.  Abdomen: Bowel sounds normal, flat, soft.  Musculoskeletal: No edema bilaterally  Skin: Warm, dry, no lesions  Neurological: No focal deficits, normal gait  Psychiatric: Mood within normal limits    Assessment/Plan:    Problem List Items Addressed This Visit       Urinary retention     Reports he stopped the Flomax, has some weak stream occasionally but not straining to urinate, nocturia once a night.  He would like to hold off on this but if he gets worse he will start taking the medication again.         PVCs (premature ventricular contractions) - Primary     Advised that these are benign arrhythmias.  Rx magnesium for symptomatic management, holding off on beta-blockers for now as his blood pressures run low.  He will  follow-up with [facility] cardiology if needed, he has a echocardiogram scheduled         Relevant Medications    magnesium oxide 400 (240 Mg) MG Tab        Return in about 1 year (around 4/28/2024) for Annual.

## 2023-04-28 NOTE — ASSESSMENT & PLAN NOTE
Advised that these are benign arrhythmias.  Rx magnesium for symptomatic management, holding off on beta-blockers for now as his blood pressures run low.  He will follow-up with [facility] cardiology if needed, he has a echocardiogram scheduled

## 2023-04-28 NOTE — ASSESSMENT & PLAN NOTE
Reports he stopped the Flomax, has some weak stream occasionally but not straining to urinate, nocturia once a night.  He would like to hold off on this but if he gets worse he will start taking the medication again.

## 2023-05-17 RX ORDER — TAMSULOSIN HYDROCHLORIDE 0.4 MG/1
0.4 CAPSULE ORAL DAILY
Qty: 90 CAPSULE | Refills: 1 | Status: SHIPPED | OUTPATIENT
Start: 2023-05-17 | End: 2024-02-21

## 2023-08-07 ENCOUNTER — TELEPHONE (OUTPATIENT)
Dept: CARDIOLOGY | Facility: MEDICAL CENTER | Age: 53
End: 2023-08-07

## 2023-08-07 NOTE — TELEPHONE ENCOUNTER
Lvm for pt to confirm if pt has completed or has echo scheduled with outside facility, per LOV with AB, prefer to see pt AFTER echo completed.    Routing to PAR for assistance if echo not completed we can schedule echo and [facility] after echo

## 2023-08-30 ENCOUNTER — APPOINTMENT (OUTPATIENT)
Dept: CARDIOLOGY | Facility: PHYSICIAN GROUP | Age: 53
End: 2023-08-30
Payer: COMMERCIAL

## 2024-01-09 DIAGNOSIS — R55 SYNCOPE AND COLLAPSE: ICD-10-CM

## 2024-01-10 ENCOUNTER — TELEPHONE (OUTPATIENT)
Dept: CARDIOLOGY | Facility: MEDICAL CENTER | Age: 54
End: 2024-01-10
Payer: COMMERCIAL

## 2024-01-10 NOTE — TELEPHONE ENCOUNTER
----- Message from JESSE Solano sent at 1/9/2024 12:46 PM PST -----  I sent him a [employer] message. He is for annual follow-up with me in April 2024 - if possible to schedule, that would be great.  Thanks, AB

## 2024-02-21 ENCOUNTER — OFFICE VISIT (OUTPATIENT)
Dept: CARDIOLOGY | Facility: PHYSICIAN GROUP | Age: 54
End: 2024-02-21
Payer: COMMERCIAL

## 2024-02-21 VITALS
HEIGHT: 72 IN | OXYGEN SATURATION: 97 % | HEART RATE: 71 BPM | RESPIRATION RATE: 12 BRPM | BODY MASS INDEX: 19.2 KG/M2 | WEIGHT: 141.76 LBS | SYSTOLIC BLOOD PRESSURE: 110 MMHG | DIASTOLIC BLOOD PRESSURE: 62 MMHG

## 2024-02-21 DIAGNOSIS — E78.2 MIXED HYPERLIPIDEMIA: ICD-10-CM

## 2024-02-21 DIAGNOSIS — R55 SYNCOPE AND COLLAPSE: ICD-10-CM

## 2024-02-21 DIAGNOSIS — S06.0X9D CONCUSSION WITH LOSS OF CONSCIOUSNESS, SUBSEQUENT ENCOUNTER: ICD-10-CM

## 2024-02-21 DIAGNOSIS — I51.9 LV DYSFUNCTION: ICD-10-CM

## 2024-02-21 DIAGNOSIS — I49.3 PVCS (PREMATURE VENTRICULAR CONTRACTIONS): ICD-10-CM

## 2024-02-21 PROCEDURE — 3078F DIAST BP <80 MM HG: CPT | Performed by: NURSE PRACTITIONER

## 2024-02-21 PROCEDURE — 99214 OFFICE O/P EST MOD 30 MIN: CPT | Performed by: NURSE PRACTITIONER

## 2024-02-21 PROCEDURE — 3074F SYST BP LT 130 MM HG: CPT | Performed by: NURSE PRACTITIONER

## 2024-02-21 RX ORDER — FEXOFENADINE HCL 60 MG/1
60 TABLET, FILM COATED ORAL DAILY
COMMUNITY

## 2024-02-21 ASSESSMENT — ENCOUNTER SYMPTOMS
FEVER: 0
COUGH: 0
CHILLS: 0
BRUISES/BLEEDS EASILY: 0
NAUSEA: 0
ABDOMINAL PAIN: 0
PND: 0
SHORTNESS OF BREATH: 0
ORTHOPNEA: 0
DIZZINESS: 0
LOSS OF CONSCIOUSNESS: 0
PALPITATIONS: 0
HEADACHES: 0
INSOMNIA: 1
MYALGIAS: 0

## 2024-02-21 NOTE — PROGRESS NOTES
Chief Complaint   Patient presents with    Follow-Up    Premature Ventricular Contractions (PVCs)           Syncope       Subjective     Juan Paniagua is a 53 y.o. male who presents today for annual follow-up of history of syncope.    Sherif is a 53 year old male first seen by me in April 2023 for a syncope episode following some severe abdominal pain in November 2022, resulting in a concussion. He did have a ZioPatch on March 2023, which showed several short episodes of SVT, and PVC burden at 2.1%.    In April 2023, I recommended an echocardiogram, which completed in January 2024, which showed LVEF 45-50%.    He is here today to discuss results. He does still feel some occasional skipped beats, nonsustained. No overt chest pain, pressure or discomfort; no sustained palpitations; no shortness of breath; no dizziness; no LE edema. No further episodes of syncope. BP is on the lower side.    He did have an episode a couple of weeks ago, while shoveling snow, where he felt very tired and short of breath.    No recent episodes of any acute illness, including flu or Covid.     He does not smoke, rarely drinks EtOH and denies any other drug use, including marijuana. No family history of premature CAD, that he knows of.    Past Medical History:   Diagnosis Date    Concussion 11/2022    LV dysfunction 01/2024    Echocardiogram with normal LV size, LVEF 45-50%. Normal RV. Trace MR, trace TR.    PVCs (premature ventricular contractions) 03/2023    ZioPatch with PVC burden 2.1%    Syncope 11/2022    Hit head and developed concussion    Thyroid disease      Past Surgical History:   Procedure Laterality Date    CHOLECYSTECTOMY  2015    APPENDECTOMY      As a child    CYST EXCISION      choledocal cyst    HERNIA REPAIR       Family History   Problem Relation Age of Onset    Psychiatric Illness Father      Social History     Socioeconomic History    Marital status:      Spouse name: Not on file    Number of  children: Not on file    Years of education: Not on file    Highest education level: Not on file   Occupational History    Not on file   Tobacco Use    Smoking status: Never    Smokeless tobacco: Never   Vaping Use    Vaping Use: Never used   Substance and Sexual Activity    Alcohol use: Not Currently    Drug use: Never    Sexual activity: Not on file   Other Topics Concern    Not on file   Social History Narrative    Not on file     Social Determinants of Health     Financial Resource Strain: Not on file   Food Insecurity: Not on file   Transportation Needs: Not on file   Physical Activity: Not on file   Stress: Not on file   Social Connections: Not on file   Intimate Partner Violence: Not on file   Housing Stability: Not on file     No Known Allergies  Outpatient Encounter Medications as of 2/21/2024   Medication Sig Dispense Refill    fexofenadine (ALLEGRA ALLERGY) 60 MG Tab Take 60 mg by mouth every day.      Ascorbic Acid (JAGDEEP-C PO) Take  by mouth.      VITAMIN D PO Take  by mouth.      Vitamins-Lipotropics ([employer] VITAMIN B/C PO) Take  by mouth.      tamsulosin (FLOMAX) 0.4 MG capsule Take 1 Capsule by mouth every day. 90 Capsule 1    magnesium oxide 400 (240 Mg) MG Tab Take 1 Tablet by mouth every day. 60 Tablet 3     No facility-administered encounter medications on file as of 2/21/2024.     Review of Systems   Constitutional:  Positive for malaise/fatigue. Negative for chills and fever.   HENT:  Negative for congestion.    Respiratory:  Negative for cough and shortness of breath.    Cardiovascular:  Negative for chest pain, palpitations, orthopnea, leg swelling and PND.   Gastrointestinal:  Negative for abdominal pain and nausea.   Musculoskeletal:  Negative for myalgias.   Skin:  Negative for rash.   Neurological:  Negative for dizziness, loss of consciousness and headaches.   Endo/Heme/Allergies:  Does not bruise/bleed easily.   Psychiatric/Behavioral:  The patient has insomnia.               Objective      /62 (BP Location: Right arm, Patient Position: Sitting, BP Cuff Size: Adult)   Pulse 71   Resp 12   Ht 1.829 m (6')   Wt 64.3 kg (141 lb 12.1 oz)   SpO2 97%   BMI 19.23 kg/m²     Physical Exam  Constitutional:       Appearance: He is well-developed.      Comments: BMI 19.23 Tall, thin   HENT:      Head: Normocephalic.   Neck:      Vascular: No JVD.   Cardiovascular:      Rate and Rhythm: Normal rate and regular rhythm.      Heart sounds: Normal heart sounds.   Pulmonary:      Effort: Pulmonary effort is normal. No respiratory distress.      Breath sounds: Normal breath sounds. No wheezing or rales.   Abdominal:      General: Bowel sounds are normal. There is no distension.      Palpations: Abdomen is soft.      Tenderness: There is no abdominal tenderness.   Musculoskeletal:         General: Normal range of motion.      Cervical back: Normal range of motion and neck supple.   Skin:     General: Skin is warm and dry.      Findings: No rash.   Neurological:      Mental Status: He is alert and oriented to person, place, and time.   Psychiatric:         Mood and Affect: Mood normal.         Behavior: Behavior normal.       Interpretation Summary of TTE of 1/8/2024:  The Ejection Fraction estimate is 45-50%   Left ventricular systolic function is borderline reduced.   The left ventricle is normal in size.   The right ventricular systolic function is mildly reduced.   Normal valvular structure and function.   There is no pericardial effusion.      Summary of ZioPatch of 3/10/2023:  Short runs of supraventricular tachycardia that do not meet criteria for diagnosis of atrial flutter or atrial fibrillation.   Rare premature atrial complexes (<1%).   Occasional ventricular complexes (2.1%).   No malignant arrhythmias identified.   No sinus pause.   No significant AV block.      IMPRESSION OF CT SCAN OF HEAD OF 11/5/2022:  1.  No evidence of acute intracranial process.  2.  Atrophy.    No recent labwork done.      LABS AS OF 9/10/2022:  Cholesterol, S/P <=200 mg/dL 180    Triglycerides <=150 mg/dL 109    LDL Cholesterol <=99 mg/dL 116 High     HDL Cholesterol 40 - 59 mg/dL 54    Cholesterol/HDL Ratio <=4.5 ratio 3.3       TSH 0.27 - 4.20 mU/L 1.91        Assessment & Plan     1. LV dysfunction  EC-ECHOCARDIOGRAM COMPLETE W/O CONT      2. Mixed hyperlipidemia  Comp Metabolic Panel    Lipid Profile      3. PVCs (premature ventricular contractions)        4. Syncope and collapse        5. Concussion with loss of consciousness, subsequent encounter            Medical Decision Making: Today's Assessment/Status/Plan:        LV dysfunction, with LVEF 45-50%, unknown etiology. Offered low dose beta blocker/ACEi or ARB therapy to see if we can make improvement. He declines at this time. We did discuss HF symptoms (fatigue, weight gain, shortness of breath, LE edema) to watch for. Suggest repeat echo in 1 year to reassess.     2. Borderline hyperlipidemia. To obtain updated fasting lipid panel to assess 10 year ASCVD risk correctly. Consider coronary CT calcium score if elevated.    3. History of PVCs, 2.1% on holter monitor.    4. History of syncope in November 2022, followed intense abdominal pain episode, possible vasovagal response. No further episodes.    5. Concussion, secondary to syncope, with minimal residual symptoms.    As above, offered medical therapy to try to improve LVEF; he declines today. If he changes his mind, we can always Rx. Suggest fasting lipid panel, and repeat echo in 1 year.    Follow-up annually, sooner if clinical condition changes.

## 2024-03-21 ENCOUNTER — PATIENT MESSAGE (OUTPATIENT)
Dept: CARDIOLOGY | Facility: PHYSICIAN GROUP | Age: 54
End: 2024-03-21
Payer: COMMERCIAL

## 2024-03-21 DIAGNOSIS — I49.3 PVC'S (PREMATURE VENTRICULAR CONTRACTIONS): ICD-10-CM

## 2024-03-21 DIAGNOSIS — I49.1 APC (ATRIAL PREMATURE CONTRACTIONS): ICD-10-CM

## 2024-03-21 DIAGNOSIS — I47.10 SVT (SUPRAVENTRICULAR TACHYCARDIA) (HCC): ICD-10-CM

## 2024-03-21 RX ORDER — LOSARTAN POTASSIUM 25 MG/1
12.5 TABLET ORAL DAILY
Qty: 45 TABLET | Refills: 1 | Status: SHIPPED | OUTPATIENT
Start: 2024-03-21

## 2024-03-21 RX ORDER — METOPROLOL SUCCINATE 25 MG/1
25 TABLET, EXTENDED RELEASE ORAL DAILY
Qty: 90 TABLET | Refills: 1 | Status: SHIPPED | OUTPATIENT
Start: 2024-03-21

## 2024-03-21 NOTE — TELEPHONE ENCOUNTER
Please see pt msg.     Appt 2/21/24 notes as follows: LV dysfunction, with LVEF 45-50%, unknown etiology. Offered low dose beta blocker/ACEi or ARB therapy to see if we can make improvement. Pt declined at appt but now wants to follow through with medication.     Please advise on preferred med and dosing.

## 2024-03-21 NOTE — TELEPHONE ENCOUNTER
Iza Nicolas, A.P.N.         Would suggest Toprol XL 25mg once daily, along with Losartan 12.5mg once daily (1/2 of 25mg tablet). OK to send in Rxs.    Would suggest FU in 6-8 weeks to evaluate response.  Thanks, AB   Responded to pt via [employer]. Rx's sent.

## 2024-04-29 ENCOUNTER — PATIENT MESSAGE (OUTPATIENT)
Dept: CARDIOLOGY | Facility: PHYSICIAN GROUP | Age: 54
End: 2024-04-29
Payer: COMMERCIAL

## 2024-04-29 DIAGNOSIS — I49.3 PVCS (PREMATURE VENTRICULAR CONTRACTIONS): ICD-10-CM

## 2024-04-29 RX ORDER — MAGNESIUM OXIDE 400 MG/1
400 TABLET ORAL 2 TIMES DAILY
Qty: 60 TABLET | Refills: 6 | Status: SHIPPED | OUTPATIENT
Start: 2024-04-29

## 2024-05-08 ENCOUNTER — PATIENT MESSAGE (OUTPATIENT)
Dept: CARDIOLOGY | Facility: PHYSICIAN GROUP | Age: 54
End: 2024-05-08
Payer: COMMERCIAL

## 2024-05-08 DIAGNOSIS — I49.3 PVCS (PREMATURE VENTRICULAR CONTRACTIONS): ICD-10-CM

## 2024-05-09 ENCOUNTER — PATIENT MESSAGE (OUTPATIENT)
Dept: CARDIOLOGY | Facility: PHYSICIAN GROUP | Age: 54
End: 2024-05-09
Payer: COMMERCIAL

## 2024-05-09 DIAGNOSIS — I49.3 PVCS (PREMATURE VENTRICULAR CONTRACTIONS): ICD-10-CM

## 2024-05-09 RX ORDER — MAGNESIUM OXIDE 400 MG/1
400 TABLET ORAL 2 TIMES DAILY
Qty: 60 TABLET | Refills: 6 | Status: SHIPPED | OUTPATIENT
Start: 2024-05-09 | End: 2024-05-10 | Stop reason: SDUPTHER

## 2024-05-10 RX ORDER — MAGNESIUM OXIDE 400 MG/1
400 TABLET ORAL 2 TIMES DAILY
Qty: 60 TABLET | Refills: 6 | Status: SHIPPED | OUTPATIENT
Start: 2024-05-10

## 2024-05-31 ENCOUNTER — APPOINTMENT (OUTPATIENT)
Dept: RADIOLOGY | Facility: IMAGING CENTER | Age: 54
End: 2024-05-31
Attending: STUDENT IN AN ORGANIZED HEALTH CARE EDUCATION/TRAINING PROGRAM
Payer: COMMERCIAL

## 2024-05-31 ENCOUNTER — OFFICE VISIT (OUTPATIENT)
Dept: MEDICAL GROUP | Facility: PHYSICIAN GROUP | Age: 54
End: 2024-05-31
Payer: COMMERCIAL

## 2024-05-31 VITALS
BODY MASS INDEX: 19.29 KG/M2 | HEART RATE: 78 BPM | SYSTOLIC BLOOD PRESSURE: 118 MMHG | HEIGHT: 72 IN | TEMPERATURE: 97.7 F | RESPIRATION RATE: 14 BRPM | WEIGHT: 142.42 LBS | OXYGEN SATURATION: 97 % | DIASTOLIC BLOOD PRESSURE: 78 MMHG

## 2024-05-31 DIAGNOSIS — M25.561 ACUTE PAIN OF RIGHT KNEE: ICD-10-CM

## 2024-05-31 PROCEDURE — 73562 X-RAY EXAM OF KNEE 3: CPT | Mod: TC,RT | Performed by: PHYSICIAN ASSISTANT

## 2024-05-31 ASSESSMENT — PATIENT HEALTH QUESTIONNAIRE - PHQ9: CLINICAL INTERPRETATION OF PHQ2 SCORE: 0

## 2024-06-01 NOTE — PROGRESS NOTES
Verbal Consent given for [employer] recording software    HISTORY OF PRESENT ILLNESS: Sherif is a pleasant 53 y.o. male, established patient who presents today to discuss medical problems as listed below:    History of Present Illness  The patient is a 53-year-old male here for a follow-up visit.    The patient has been experiencing intermittent right knee pain for the past month, which commenced approximately 10 days ago. Initially, he managed to walk approximately 3 miles, however, the pain escalated to the point where he ceased walking. Over the past 10 days, the pain has been persistent. Despite taking Tylenol 1000 mg for the past 2 days, the pain persists. Today, he took 500 mg of Tylenol and 200 mg of ibuprofen, which seemed to alleviate the pain. He denies any trauma to the knee.       Current Outpatient Medications Ordered in Epic   Medication Sig Dispense Refill    magnesium oxide (MAG-OX) 400 MG Tab tablet Take 1 Tablet by mouth 2 times a day. 60 Tablet 6    Ascorbic Acid (JAGDEEP-C PO) Take  by mouth.      VITAMIN D PO Take  by mouth.      Vitamins-Lipotropics ([employer] VITAMIN B/C PO) Take  by mouth.      fexofenadine (ALLEGRA ALLERGY) 60 MG Tab Take 60 mg by mouth every day.       No current [facility]-ordered facility-administered medications on file.       Review of systems:  Per HPI    Patient Active Problem List    Diagnosis Date Noted    LV dysfunction 02/21/2024    PVCs (premature ventricular contractions) 04/26/2023    Skipped heart beats 02/07/2023    Concussion 11/08/2022    Head trauma 11/01/2022    Syncope and collapse 11/01/2022    Healthcare maintenance 08/08/2022    Urinary retention 07/22/2022     Past Surgical History:   Procedure Laterality Date    CHOLECYSTECTOMY  2015    APPENDECTOMY      As a child    CYST EXCISION      choledocal cyst    HERNIA REPAIR       Social History     Tobacco Use    Smoking status: Never    Smokeless tobacco: Never   Vaping Use    Vaping status: Never Used   Substance Use  Topics    Alcohol use: Not Currently    Drug use: Never      Family History   Problem Relation Age of Onset    Psychiatric Illness Father      Current Outpatient Medications   Medication Sig Dispense Refill    magnesium oxide (MAG-OX) 400 MG Tab tablet Take 1 Tablet by mouth 2 times a day. 60 Tablet 6    Ascorbic Acid (JAGDEEP-C PO) Take  by mouth.      VITAMIN D PO Take  by mouth.      Vitamins-Lipotropics ([employer] VITAMIN B/C PO) Take  by mouth.      fexofenadine (ALLEGRA ALLERGY) 60 MG Tab Take 60 mg by mouth every day.       No current facility-administered medications for this visit.       Allergies:  No Known Allergies    Allergies, past medical history, past surgical history, family history, social history reviewed and updated.    Objective:    /78   Pulse 78   Temp 36.5 °C (97.7 °F) (Temporal)   Resp 14   Ht 1.829 m (6')   Wt 64.6 kg (142 lb 6.7 oz)   SpO2 97%   BMI 19.32 kg/m²    Body mass index is 19.32 kg/m².    Physical exam:  Physical Exam  Constitutional:       Appearance: Normal appearance. He is normal weight.   Musculoskeletal:      Right knee: No swelling, deformity, effusion, erythema, ecchymosis or bony tenderness. Decreased range of motion. No LCL laxity, MCL laxity, ACL laxity or PCL laxity. Normal alignment.      Instability Tests: Anterior drawer test negative. Posterior drawer test negative. Anterior Lachman test negative.   Neurological:      Mental Status: He is alert.            Assessment/Plan:    Assessment & Plan  1. Right knee pain.  The patient's symptoms suggest a possible diagnosis of arthritis. An x-ray of the right knee has been ordered. The patient has been advised to limit his walking distance to 3 miles for a week. A referral for physical therapy has been made. The patient has been advised to take ibuprofen 600 mg, to be taken twice daily with food.    Follow-up  Follow-up visits will be scheduled as necessary.       Problem List Items Addressed This Visit    None  Visit  Diagnoses       Acute pain of right knee        Relevant Orders    DX-KNEE 3 VIEWS Atraumatic Pain/Swelling/Deformity (Completed)    Referral to Physical Therapy            Return if symptoms worsen or fail to improve.